# Patient Record
(demographics unavailable — no encounter records)

---

## 2025-01-15 NOTE — ASSESSMENT
[FreeTextEntry1] :   Chest pain -vice like  with severe chest pain in the setting of hyperlipidemia and family history significant for coronary artery disease -she is unable to do stress testing due to severe hip ostheoarthritis -will proceed with CT angio coronary artery -EKG with ST depression, normal sinus rhythm  Hyperlipidemia -LDL as high as 190 with high apoB -high risk for premature coronary artery disease -continue ezetimibe -had developed muscle cramps previously with atorvastatin  -at follow up will need to repeat lipid panel and discuss alternative statin therapy vs. evolocumab

## 2025-01-15 NOTE — HISTORY OF PRESENT ILLNESS
[FreeTextEntry1] : Ms. Acevedo is a 61 yo F hx of hyperlipidemia, CFS, EBV, mast cell activation syndrome, fibromyalgia, hx of ruptured appendix, osteoarthritis, osteoporosis, hx of fibroids who presents for evaluation of chest pain. She reports that she has sporadic episodes of chest pain, severe 10/10, radiation to the back. She loses her breath during these episodes which can last up to a minute. She describes it like a vice . She says she is fairly active, but her major limitation is pain related to hip osteoarthritis. She describes her diet for example consists of egg whites, escarole, beans. She reports a family history of coronary artery disease. Her father  age 80s and had CABG,  from complications of heart disease and heart failure. Her mother  age 84, from complications related to lung cancer. She has 3 kids, good health, her son has a hx of NHL. For her hyperlipidemia she has previously taken atorvastatin and has developed leg cramps as a result. She had facial swelling and a follow up CT neck showed carotid calcification. She had a CAC score of 0 done last year and some aortic calcification.

## 2025-01-15 NOTE — HISTORY OF PRESENT ILLNESS
[FreeTextEntry1] : Ms. Acevedo is a 63 yo F hx of hyperlipidemia, CFS, EBV, mast cell activation syndrome, fibromyalgia, hx of ruptured appendix, osteoarthritis, osteoporosis, hx of fibroids who presents for evaluation of chest pain. She reports that she has sporadic episodes of chest pain, severe 10/10, radiation to the back. She loses her breath during these episodes which can last up to a minute. She describes it like a vice . She says she is fairly active, but her major limitation is pain related to hip osteoarthritis. She describes her diet for example consists of egg whites, escarole, beans. She reports a family history of coronary artery disease. Her father  age 80s and had CABG,  from complications of heart disease and heart failure. Her mother  age 84, from complications related to lung cancer. She has 3 kids, good health, her son has a hx of NHL. For her hyperlipidemia she has previously taken atorvastatin and has developed leg cramps as a result. She had facial swelling and a follow up CT neck showed carotid calcification. She had a CAC score of 0 done last year and some aortic calcification.

## 2025-01-15 NOTE — PHYSICAL EXAM
[Well Developed] : well developed [Well Nourished] : well nourished [No Acute Distress] : no acute distress [Normal] : moves all extremities, no focal deficits, normal speech

## 2025-01-15 NOTE — REVIEW OF SYSTEMS
I don't know what this means.  Is this just a request for a referral to a nursing home?  If so I am totally fine with placing this referral.   [SOB] : no shortness of breath [Dyspnea on exertion] : not dyspnea during exertion [Chest Discomfort] : chest discomfort [Lower Ext Edema] : no extremity edema [Leg Claudication] : no intermittent leg claudication [Palpitations] : palpitations [Orthopnea] : no orthopnea [PND] : no PND [Syncope] : no syncope [Negative] : Heme/Lymph

## 2025-02-18 NOTE — HISTORY OF PRESENT ILLNESS
[FreeTextEntry1] : Ms. Acevedo is a 61 yo F hx of hyperlipidemia, CFS, EBV, mast cell activation syndrome, fibromyalgia, hx of ruptured appendix, osteoarthritis, osteoporosis, hx of fibroids who presents for evaluation of chest pain. In brief, she reports that she has sporadic episodes of chest pain, severe 10/10, radiation to the back. She loses her breath during these episodes which can last up to a minute. She describes it like a vice . For her hyperlipidemia she has previously taken atorvastatin and has developed leg cramps as a result. She had facial swelling and a follow up CT neck showed carotid calcification. She had a CAC score of 0 done last year and some aortic calcification.   Since our last visit together she underwent coronary CTA that showed no plaque or stenosis. She has not had repeat episodes of chest pain thus far. She notes episodes of palpitations that wake her up from sleep and happen a few times a week. She denies palpitations with activity and syncope.  
ideal/+10%

## 2025-02-18 NOTE — ASSESSMENT
[FreeTextEntry1] :  Chest pain -vice like  with severe chest pain in the setting of hyperlipidemia and family history significant for coronary artery disease -she is unable to do stress testing due to severe hip ostheoarthritis -Ct angio coronary artery revealed no plaque or stenosis.  -follow up lipid panel in 4 months   Hyperlipidemia -LDL as high as 190 with high apoB -continue ezetimibe -had developed muscle cramps previously with atorvastatin   Palpitations  -episodic, waking her up from sleep -5 day holter monitor

## 2025-06-04 NOTE — HISTORY OF PRESENT ILLNESS
[FreeTextEntry1] : Ms. Acevedo is a 61 yo F hx of hyperlipidemia, CFS, EBV, mast cell activation syndrome, fibromyalgia, hx of ruptured appendix, osteoarthritis, osteoporosis, hx of fibroids who presents for follow up of chest pain. In brief, she reports that she has sporadic episodes of chest pain, severe 10/10, radiation to the back. She loses her breath during these episodes which can last up to a minute. She describes it like a vice . For her hyperlipidemia she has previously taken atorvastatin and has developed leg cramps as a result. She had facial swelling and a follow up CT neck showed carotid calcification. She had a CAC score of 0 done last year and some aortic calcification.   Since our last visit together she underwent coronary CTA that showed no plaque or stenosis.   She was recently seen in the ED with abdominal symptoms, found to have enteritis which has since resolved. CT of the abdomen revealed aortic calcification. She continues to report symptoms of sharp, pain radiating to her back intermittently.

## 2025-06-04 NOTE — ASSESSMENT
[FreeTextEntry1] : Chest pain -vice like  with severe chest pain in the setting of hyperlipidemia and family history significant for coronary artery disease -she is unable to do stress testing due to severe hip ostheoarthritis -Ct angio coronary artery revealed no plaque or stenosis.  -CT cardiac and recent CT abdomen notable for aortic calcification-reviewed imaging with the patient and counseled nothing to do; must control cardiovascular risk factors -intermittent chest pains may be related to MSK/spine in light of normal cardiac CT vs. vasospastic angina  Hyperlipidemia -LDL as high as 190 with high apoB -continue ezetimibe -had developed muscle cramps previously with atorvastatin  -follow up LDL at follow up, may require PCSK9 given statin intolerance   Palpitations  -episodic, waking her up from sleep; appears better controlled recently -did not previously follow up with Holter monitoring